# Patient Record
Sex: FEMALE | Race: AMERICAN INDIAN OR ALASKA NATIVE | HISPANIC OR LATINO | URBAN - METROPOLITAN AREA
[De-identification: names, ages, dates, MRNs, and addresses within clinical notes are randomized per-mention and may not be internally consistent; named-entity substitution may affect disease eponyms.]

---

## 2024-08-21 ENCOUNTER — APPOINTMENT (RX ONLY)
Dept: URBAN - METROPOLITAN AREA CLINIC 61 | Facility: CLINIC | Age: 49
Setting detail: DERMATOLOGY
End: 2024-08-21

## 2024-08-21 DIAGNOSIS — R21 RASH AND OTHER NONSPECIFIC SKIN ERUPTION: ICD-10-CM | Status: INADEQUATELY CONTROLLED

## 2024-08-21 DIAGNOSIS — L60.3 NAIL DYSTROPHY: ICD-10-CM

## 2024-08-21 PROCEDURE — ? PRESCRIPTION

## 2024-08-21 PROCEDURE — ? KOH PREP

## 2024-08-21 PROCEDURE — ? COUNSELING

## 2024-08-21 PROCEDURE — ? PATIENT SPECIFIC COUNSELING

## 2024-08-21 PROCEDURE — 99204 OFFICE O/P NEW MOD 45 MIN: CPT

## 2024-08-21 RX ORDER — PREDNISONE 10 MG/1
TABLET ORAL
Qty: 52 | Refills: 0 | Status: ERX | COMMUNITY
Start: 2024-08-21

## 2024-08-21 RX ADMIN — PREDNISONE: 10 TABLET ORAL at 00:00

## 2024-08-21 ASSESSMENT — LOCATION ZONE DERM: LOCATION ZONE: FINGER

## 2024-08-21 ASSESSMENT — BSA RASH: BSA RASH: 2

## 2024-08-21 ASSESSMENT — LOCATION SIMPLE DESCRIPTION DERM: LOCATION SIMPLE: LEFT RING FINGER

## 2024-08-21 ASSESSMENT — LOCATION DETAILED DESCRIPTION DERM: LOCATION DETAILED: LEFT PROXIMAL ULNAR PALMAR RING FINGER

## 2024-08-21 NOTE — PROCEDURE: COUNSELING
Detail Level: Detailed
Patient Specific Counseling (Will Not Stick From Patient To Patient): Patient's toenails are very thickened.  She was advised to let them grow until her next visit in 3 weeks.  She was also advised to remove her nail polish.  At that time we will obtain a clipping to be sent in to see if this is fungal in nature.  Also in the differential would be a manifestation of psoriasis.

## 2024-08-21 NOTE — HPI: RASH
How Severe Is Your Rash?: severe
Is This A New Presentation, Or A Follow-Up?: Rash
Additional History: Patient reports a several year history of cracking primarily on her right dorsal palm and fourth finger.  She also gets redness itching and scaling on the back of her right hand.  She does not notice the symptoms occurring very often on the left hand.  These cracks scaling and fissuring lead to significant pain and itching at the site.  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  She states the site of where it cracks is where she holds her araceli.  She has started to limit her clients to try to prevent worsening of this condition.  Patient reports that she has previously been treated with Dupixent which she took for 3 months and did not notice any improvement.  She has had courses of oral steroids and reports that does lead to some improvement.  She has treated her hands topically with clobetasol and Eucrisa with some improvement.  She states that she has previously had patch testing which showed an allergy to colophony.  Patient was also previously treated with narrowband UVB therapy for her hands which led to some improvement.  Today she is very tearful as this is very bothersome and it is very flared today.  She also notes some scaling inflammation and itching around her lips.

## 2024-08-21 NOTE — PROCEDURE: PATIENT SPECIFIC COUNSELING
Patient has a history of chronic scaling and fissuring as well as itching and pain primarily on the right hand.  She previously had this rash only on the hand but is now getting some scaling and inflammation around the lips.  This is very distressing for her.  Patient has had multiple treatments for this condition on her hand including topical steroids, narrowband UVB, Dupixent, and oral steroids.  She was most recently tried on Dupixent, but stopped after 3 months as she was not getting any improvement.  She has not been treating with anything recently and it is very flared.  She reports light therapy did lead to some improvement, but she has not had that recently.  She has also been avoiding the allergen identified through patch testing and has not noticed much improvement.  Today the site is very cracked and appears very painful.  Due to the severe nature of this eruption we will treat with a course of oral steroids.  Patient will then return to clinic in 3 weeks and we will reevaluate.  If she has had significant improvement with oral steroids then may switch over to a topical regimen of clobetasol and Eucrisa which she currently has at home.  Could also consider using Eucrisa around the mouth.  Patient also reports recurrent joint pain in her hips and lower back. Her toenails are also thickened.  If patient is not having much improvement with treatments, then may consider a punch biopsy to confirm the diagnosis of eczema.  A KOH was performed today to ensure we are not missing a fungal infection, but no specific findings were identified.  We may consider repeating a KOH at her next visit.  If this continues to appear to be eczematous in nature, then may consider other systemic therapies such as methotrexate or Rinvoq.  Another consideration would be another round of narrowband UVB therapy for which she would have to go to [facility].
Detail Level: Zone

## 2024-09-11 ENCOUNTER — APPOINTMENT (RX ONLY)
Dept: URBAN - METROPOLITAN AREA CLINIC 61 | Facility: CLINIC | Age: 49
Setting detail: DERMATOLOGY
End: 2024-09-11

## 2024-09-11 DIAGNOSIS — L30.8 OTHER SPECIFIED DERMATITIS: ICD-10-CM

## 2024-09-11 PROCEDURE — 99212 OFFICE O/P EST SF 10 MIN: CPT

## 2024-09-11 PROCEDURE — ? COUNSELING

## 2024-09-11 NOTE — PROCEDURE: COUNSELING
Detail Level: Detailed
Patient Specific Counseling (Will Not Stick From Patient To Patient): Patient did a course of prednisone and it improved considerably. Exam today is consistent with mostly treated hand dermatitis, but she still has some slight scaling and fissuring. Now that her acute flare is improved, we need a long term plan. We discussed options of a topical regimen vs. Rinvoq vs. methotrexate.  She would like to try the topical regimen at this time. Advised patient to use clobetasol nightly under occlusion x 1 week, then Eucrisa twice daily x 1 week and continue to alternate between these two medications with any continued flaring. When not flared, she will continue with aggressive emollients. We discussed changing from Dial soap to a gentler cleanser and our gentle skin care handout was given with multiple options for cleansers. She states that she has previously been on Elidel, but we may consider prescribing that medication if she is not getting improvement with the above regimen. If she fails high potency topical steroid, Eucrisa, and Elidel, then may consider Opzelura topically. Patient would consider Rinvoq if topical regimens are not effective. Patient has previously failed Dupixent. She was also previously on nbUVB therapy.